# Patient Record
Sex: FEMALE | Race: WHITE | Employment: FULL TIME | URBAN - METROPOLITAN AREA
[De-identification: names, ages, dates, MRNs, and addresses within clinical notes are randomized per-mention and may not be internally consistent; named-entity substitution may affect disease eponyms.]

---

## 2017-05-22 DIAGNOSIS — E06.3 HASHIMOTO'S THYROIDITIS: Primary | ICD-10-CM

## 2017-05-22 DIAGNOSIS — E06.3 HASHIMOTO'S THYROIDITIS: ICD-10-CM

## 2017-05-22 LAB
T3 TOTAL: 1.08 NG/ML (ref 0.8–2)
T4 TOTAL: 6.1 UG/DL (ref 4.5–11.7)
TSH SERPL DL<=0.05 MIU/L-ACNC: 3.03 UIU/ML (ref 0.27–4.2)

## 2017-05-26 ENCOUNTER — OFFICE VISIT (OUTPATIENT)
Dept: SURGERY | Age: 52
End: 2017-05-26

## 2017-05-26 VITALS
HEIGHT: 66 IN | HEART RATE: 80 BPM | DIASTOLIC BLOOD PRESSURE: 76 MMHG | SYSTOLIC BLOOD PRESSURE: 124 MMHG | WEIGHT: 177 LBS | BODY MASS INDEX: 28.45 KG/M2

## 2017-05-26 DIAGNOSIS — E06.3 HASHIMOTO'S THYROIDITIS: Primary | ICD-10-CM

## 2017-05-26 DIAGNOSIS — E89.0 S/P PARTIAL THYROIDECTOMY: ICD-10-CM

## 2017-05-26 PROCEDURE — 99213 OFFICE O/P EST LOW 20 MIN: CPT | Performed by: INTERNAL MEDICINE

## 2017-06-16 ENCOUNTER — HOSPITAL ENCOUNTER (OUTPATIENT)
Dept: WOMENS IMAGING | Age: 52
Discharge: HOME | End: 2017-06-16
Payer: COMMERCIAL

## 2017-06-16 DIAGNOSIS — Z13.9 SCREENING: ICD-10-CM

## 2017-06-16 PROCEDURE — G0202 SCR MAMMO BI INCL CAD: HCPCS

## 2018-05-19 DIAGNOSIS — E06.3 HASHIMOTO'S THYROIDITIS: ICD-10-CM

## 2018-05-19 LAB
T4 FREE: 1.11 NG/DL (ref 0.93–1.7)
TSH SERPL DL<=0.05 MIU/L-ACNC: 2.45 UIU/ML (ref 0.27–4.2)

## 2018-05-25 ENCOUNTER — OFFICE VISIT (OUTPATIENT)
Dept: ENDOCRINOLOGY | Age: 53
End: 2018-05-25
Payer: COMMERCIAL

## 2018-05-25 VITALS
DIASTOLIC BLOOD PRESSURE: 72 MMHG | HEART RATE: 91 BPM | BODY MASS INDEX: 28.93 KG/M2 | HEIGHT: 66 IN | WEIGHT: 180 LBS | SYSTOLIC BLOOD PRESSURE: 107 MMHG

## 2018-05-25 DIAGNOSIS — E89.0 S/P PARTIAL THYROIDECTOMY: ICD-10-CM

## 2018-05-25 DIAGNOSIS — E06.3 HASHIMOTO'S THYROIDITIS: Primary | ICD-10-CM

## 2018-05-25 PROCEDURE — 99213 OFFICE O/P EST LOW 20 MIN: CPT | Performed by: INTERNAL MEDICINE

## 2018-07-05 ENCOUNTER — HOSPITAL ENCOUNTER (OUTPATIENT)
Dept: WOMENS IMAGING | Age: 53
Discharge: HOME | End: 2018-07-07
Payer: COMMERCIAL

## 2018-07-05 DIAGNOSIS — Z12.31 ENCOUNTER FOR SCREENING MAMMOGRAM FOR BREAST CANCER: ICD-10-CM

## 2018-07-05 PROCEDURE — 77063 BREAST TOMOSYNTHESIS BI: CPT

## 2019-05-11 DIAGNOSIS — E89.0 S/P PARTIAL THYROIDECTOMY: ICD-10-CM

## 2019-05-11 LAB
T4 FREE: 0.92 NG/DL (ref 0.84–1.68)
TSH SERPL DL<=0.05 MIU/L-ACNC: 2.43 UIU/ML (ref 0.44–3.86)

## 2019-05-17 ENCOUNTER — OFFICE VISIT (OUTPATIENT)
Dept: ENDOCRINOLOGY | Age: 54
End: 2019-05-17
Payer: COMMERCIAL

## 2019-05-17 VITALS
HEIGHT: 66 IN | DIASTOLIC BLOOD PRESSURE: 69 MMHG | HEART RATE: 92 BPM | SYSTOLIC BLOOD PRESSURE: 104 MMHG | WEIGHT: 182 LBS | BODY MASS INDEX: 29.25 KG/M2

## 2019-05-17 DIAGNOSIS — E06.3 HASHIMOTO'S THYROIDITIS: Primary | ICD-10-CM

## 2019-05-17 PROCEDURE — 99213 OFFICE O/P EST LOW 20 MIN: CPT | Performed by: INTERNAL MEDICINE

## 2019-05-17 NOTE — PROGRESS NOTES
Subjective:      Patient ID: Juanita Lyles is a 48 y.o. female. 12 month f/u on hashimoto's   Other   This is a chronic (hashimoto's thyroiditis) problem. The current episode started more than 1 year ago. The problem has been unchanged. reviewed labs 11/2018 chem cbc and thyroid panel normal     S/p partial thyroidectomy in 2008 not on replacement     Results for David Painter (MRN [id number]) as of 5/17/2019 09:24   Ref. Range 5/11/2019 08:02   TSH Latest Ref Range: 0.440 - 3.860 uIU/mL 2.430   T4 Free Latest Ref Range: 0.84 - 1.68 ng/dL 0.92           Patient Active Problem List   Diagnosis    Hashimoto's thyroiditis     No Known Allergies      Review of Systems   All other systems reviewed and are negative. Vitals:    05/17/19 0904   BP: 104/69   Site: Left Upper Arm   Position: Sitting   Cuff Size: Large Adult   Pulse: 92   Weight: 182 lb (82.6 kg)   Height: 5' 6\" (1.676 m)       Objective:   Physical Exam   Constitutional: She appears well-developed and well-nourished. HENT:   Head: Normocephalic and atraumatic. Neck: Neck supple. Cardiovascular: Normal rate. Musculoskeletal: Normal range of motion. Skin: Skin is warm. Psychiatric: She has a normal mood and affect. Assessment:       Diagnosis Orders   1.  Hashimoto's thyroiditis  TSH without Reflex    T4, Free           Plan:      Orders Placed This Encounter   Procedures    TSH without Reflex     Standing Status:   Future     Standing Expiration Date:   5/16/2020    T4, Free     Standing Status:   Future     Standing Expiration Date:   5/17/2020     F/u in 12 months

## 2019-07-12 ENCOUNTER — HOSPITAL ENCOUNTER (OUTPATIENT)
Dept: WOMENS IMAGING | Age: 54
Discharge: HOME | End: 2019-07-14
Payer: COMMERCIAL

## 2019-07-12 DIAGNOSIS — Z12.31 ENCOUNTER FOR SCREENING MAMMOGRAM FOR BREAST CANCER: ICD-10-CM

## 2019-07-12 PROCEDURE — 77063 BREAST TOMOSYNTHESIS BI: CPT

## 2020-05-04 DIAGNOSIS — E06.3 HASHIMOTO'S THYROIDITIS: ICD-10-CM

## 2020-05-04 LAB
T4 FREE: 0.96 NG/DL (ref 0.84–1.68)
TSH SERPL DL<=0.05 MIU/L-ACNC: 3.2 UIU/ML (ref 0.44–3.86)

## 2020-05-15 ENCOUNTER — VIRTUAL VISIT (OUTPATIENT)
Dept: ENDOCRINOLOGY | Age: 55
End: 2020-05-15
Payer: COMMERCIAL

## 2020-05-15 PROCEDURE — 99442 PR PHYS/QHP TELEPHONE EVALUATION 11-20 MIN: CPT | Performed by: INTERNAL MEDICINE

## 2020-05-15 NOTE — PROGRESS NOTES
5/15/2020    TELEHEALTH EVALUATION -- Audio/Visual (During [facility] public health emergency)    Due to Jignesh 19 outbreak, patient's office visit was converted to a virtual visit. Patient was contacted and agreed to proceed with a virtual visit via Telephone Visit  The risks and benefits of converting to a virtual visit were discussed in light of the current infectious disease epidemic. Patient also understood that insurance coverage and co-pays are up to their individual insurance plans. HPI: Patient made aware this is a telephone visit billable visit agreed to proceed    Aayush Cobb (:  1965) has requested an audio/video evaluation for the following concern(s):    Hashimoto thyroiditis patient not on any replacement thyroid function test have been stable no active or new symptoms otherwise this is 12-month follow-up    Results for Mary Oswald (MRN [id number]) as of 5/15/2020 09:30   Ref.  Range 7/10/2018 01:28 2019 08:02 2019 07:52 2019 05:09 2020 08:01   TSH Latest Ref Range: 0.440 - 3.860 uIU/mL  2.430   3.200   T4 Free Latest Ref Range: 0.84 - 1.68 ng/dL  0.92   0.96     Patient Active Problem List   Diagnosis    Hashimoto's thyroiditis         Review of Systems    Prior to Visit Medications    Not on File       Social History     Tobacco Use    Smoking status: Not on file   Substance Use Topics    Alcohol use: Not on file    Drug use: Not on file            PHYSICAL EXAMINATION:  [ INSTRUCTIONS:  \"[x]\" Indicates a positive item  \"[]\" Indicates a negative item  -- DELETE ALL ITEMS NOT EXAMINED]  [] Alert  [] Oriented to person/place/time    [] No apparent distress  [] Toxic appearing    [] Face flushed appearing [] Sclera clear  [] Lips are cyanotic      [] Breathing appears normal  [] Appears tachypneic      [] Rash on visible skin    [] Cranial Nerves II-XII grossly intact    [] Motor grossly intact in visible upper extremities    [] Motor grossly intact in

## 2021-05-04 ENCOUNTER — HOSPITAL ENCOUNTER (OUTPATIENT)
Dept: WOMENS IMAGING | Age: 56
Discharge: HOME | End: 2021-05-06
Payer: COMMERCIAL

## 2021-05-04 DIAGNOSIS — E89.0 S/P PARTIAL THYROIDECTOMY: ICD-10-CM

## 2021-05-04 DIAGNOSIS — Z12.31 ENCOUNTER FOR SCREENING MAMMOGRAM FOR MALIGNANT NEOPLASM OF BREAST: ICD-10-CM

## 2021-05-04 LAB
T4 FREE: 0.93 NG/DL (ref 0.84–1.68)
TSH SERPL DL<=0.05 MIU/L-ACNC: 1.69 UIU/ML (ref 0.44–3.86)

## 2021-05-04 PROCEDURE — 77063 BREAST TOMOSYNTHESIS BI: CPT

## 2021-05-14 ENCOUNTER — OFFICE VISIT (OUTPATIENT)
Dept: ENDOCRINOLOGY | Age: 56
End: 2021-05-14
Payer: COMMERCIAL

## 2021-05-14 VITALS
HEART RATE: 76 BPM | SYSTOLIC BLOOD PRESSURE: 107 MMHG | OXYGEN SATURATION: 98 % | DIASTOLIC BLOOD PRESSURE: 64 MMHG | HEIGHT: 66 IN | WEIGHT: 177 LBS | BODY MASS INDEX: 28.45 KG/M2

## 2021-05-14 DIAGNOSIS — E06.3 HASHIMOTO'S THYROIDITIS: Primary | ICD-10-CM

## 2021-05-14 DIAGNOSIS — E89.0 S/P PARTIAL THYROIDECTOMY: ICD-10-CM

## 2021-05-14 PROCEDURE — 99213 OFFICE O/P EST LOW 20 MIN: CPT | Performed by: INTERNAL MEDICINE

## 2021-05-14 ASSESSMENT — ENCOUNTER SYMPTOMS: SWOLLEN GLANDS: 0

## 2021-05-14 NOTE — PROGRESS NOTES
Neurological:      General: No focal deficit present. Mental Status: She is alert and oriented to person, place, and time.    Psychiatric:         Mood and Affect: Mood normal.         Behavior: Behavior normal.

## 2022-05-03 DIAGNOSIS — E06.3 HASHIMOTO'S THYROIDITIS: ICD-10-CM

## 2022-05-03 LAB
T4 FREE: 0.99 NG/DL (ref 0.84–1.68)
TSH REFLEX: 1.99 UIU/ML (ref 0.44–3.86)

## 2022-05-13 ENCOUNTER — OFFICE VISIT (OUTPATIENT)
Dept: ENDOCRINOLOGY | Age: 57
End: 2022-05-13
Payer: COMMERCIAL

## 2022-05-13 VITALS
BODY MASS INDEX: 28.51 KG/M2 | OXYGEN SATURATION: 98 % | HEART RATE: 71 BPM | SYSTOLIC BLOOD PRESSURE: 114 MMHG | DIASTOLIC BLOOD PRESSURE: 70 MMHG | HEIGHT: 66 IN | WEIGHT: 177.4 LBS

## 2022-05-13 DIAGNOSIS — E06.3 HASHIMOTO'S THYROIDITIS: Primary | ICD-10-CM

## 2022-05-13 PROCEDURE — 99213 OFFICE O/P EST LOW 20 MIN: CPT | Performed by: INTERNAL MEDICINE

## 2022-05-13 NOTE — PROGRESS NOTES
5/13/2022    Assessment:       Diagnosis Orders   1. Hashimoto's thyroiditis           PLAN:     RPT LAB IN 12 MONTHS     Orders Placed This Encounter   Procedures    TSH with Reflex     Standing Status:   Future     Standing Expiration Date:   5/13/2023    T4, Free     Standing Status:   Future     Standing Expiration Date:   5/13/2023       Subjective:     Chief Complaint   Patient presents with    Hashimoto's Thyroiditis     Vitals:    05/13/22 0849   BP: 114/70   Site: Left Upper Arm   Position: Sitting   Cuff Size: Medium Adult   Pulse: 71   SpO2: 98%   Weight: 177 lb 6.4 oz (80.5 kg)   Height: 5' 6\" (1.676 m)     Wt Readings from Last 3 Encounters:   05/13/22 177 lb 6.4 oz (80.5 kg)   05/14/21 177 lb (80.3 kg)   05/17/19 182 lb (82.6 kg)     BP Readings from Last 3 Encounters:   05/13/22 114/70   05/14/21 107/64   05/17/19 104/69     Follow-up on Hashimoto thyroiditis patient not on replacement labs were done recently stable recently had headaches was evaluated probably related to stress patient denies any heat or cold intolerance    Other  This is a chronic problem. The current episode started more than 1 year ago. The problem occurs rarely. The problem has been waxing and waning. Pertinent negatives include no neck pain. She has tried nothing for the symptoms. The treatment provided moderate relief. Results for Marie Gold (MRN [id number]) as of 5/13/2022 09:21   Ref. Range 5/4/2020 08:01 5/4/2021 08:16 5/4/2021 08:42 5/3/2022 08:20   TSH Latest Ref Range: 0.440 - 3.860 uIU/mL 3.200  1.690 1.990   T4 Free Latest Ref Range: 0.84 - 1.68 ng/dL 0.96  0.93 0.99       No past medical history on file. No past surgical history on file.   Social History     Socioeconomic History    Marital status: Single     Spouse name: Not on file    Number of children: Not on file    Years of education: Not on file    Highest education level: Not on file   Occupational History    Not on file   Tobacco Use    Smoking status: Never Smoker    Smokeless tobacco: Never Used   Substance and Sexual Activity    Alcohol use: Not on file    Drug use: Not on file    Sexual activity: Not on file   Other Topics Concern    Not on file   Social History Narrative    Not on file     Social Determinants of Health     Financial Resource Strain:     Difficulty of Paying Living Expenses: Not on file   Food Insecurity:     Worried About Running Out of Food in the Last Year: Not on file    Josiah of Food in the Last Year: Not on file   Transportation Needs:     Lack of Transportation (Medical): Not on file    Lack of Transportation (Non-Medical): Not on file   Physical Activity:     Days of Exercise per Week: Not on file    Minutes of Exercise per Session: Not on file   Stress:     Feeling of Stress : Not on file   Social Connections:     Frequency of Communication with Friends and Family: Not on file    Frequency of Social Gatherings with Friends and Family: Not on file    Attends Muslim Services: Not on file    Active Member of [address] [place] [address] [employer] or Organizations: Not on file    Attends Club or Organization Meetings: Not on file    Marital Status: Not on file   Intimate Partner Violence:     Fear of Current or Ex-Partner: Not on file    Emotionally Abused: Not on file    Physically Abused: Not on file    Sexually Abused: Not on file   Housing Stability:     Unable to Pay for Housing in the Last Year: Not on file    Number of [place] in the Last Year: Not on file    Unstable Housing in the Last Year: Not on file     Family History   Problem Relation Age of Onset    Breast Cancer Paternal Grandmother      Allergies   Allergen Reactions    Thiopental Other (See Comments)     Becomes sick and blood pressure drops    [place] Venom Swelling    Diphenhydramine Rash     No current outpatient medications on file.     Lab Results   Component Value Date    TSH 1.690 05/04/2021    TSH 3.200 05/04/2020    TSH 2.430 05/11/2019    TSHREFLEX 1.990 05/03/2022    T4FREE 0.99 05/03/2022    T4FREE 0.93 05/04/2021    T4FREE 0.96 05/04/2020     No results found for: TPOABS    Review of Systems   Cardiovascular: Negative. Endocrine: Negative for cold intolerance and heat intolerance. Musculoskeletal: Negative for neck pain. Neurological: Negative. All other systems reviewed and are negative. Objective:   Physical Exam  Vitals reviewed. Constitutional:       Appearance: Normal appearance. HENT:      Head: Normocephalic and atraumatic. Right Ear: External ear normal.      Left Ear: External ear normal.      Nose: Nose normal.   Eyes:      General: No scleral icterus. Right eye: No discharge. Left eye: No discharge. Extraocular Movements: Extraocular movements intact. Conjunctiva/sclera: Conjunctivae normal.   Cardiovascular:      Rate and Rhythm: Normal rate. Pulmonary:      Effort: Pulmonary effort is normal.   Musculoskeletal:         General: Normal range of motion. Cervical back: Normal range of motion and neck supple. Neurological:      General: No focal deficit present. Mental Status: She is alert and oriented to person, place, and time.    Psychiatric:         Mood and Affect: Mood normal.         Behavior: Behavior normal. Patient

## 2022-12-12 ENCOUNTER — HOSPITAL ENCOUNTER (OUTPATIENT)
Dept: WOMENS IMAGING | Age: 57
Discharge: HOME | End: 2022-12-14
Payer: COMMERCIAL

## 2022-12-12 DIAGNOSIS — Z12.31 ENCOUNTER FOR SCREENING MAMMOGRAM FOR MALIGNANT NEOPLASM OF BREAST: ICD-10-CM

## 2022-12-12 PROCEDURE — 77063 BREAST TOMOSYNTHESIS BI: CPT

## 2023-05-05 DIAGNOSIS — E06.3 HASHIMOTO'S THYROIDITIS: ICD-10-CM

## 2023-05-05 LAB
T4 FREE SERPL-MCNC: 1.13 NG/DL (ref 0.84–1.68)
TSH REFLEX: 2.29 UIU/ML (ref 0.44–3.86)

## 2023-05-12 ENCOUNTER — OFFICE VISIT (OUTPATIENT)
Dept: ENDOCRINOLOGY | Age: 58
End: 2023-05-12
Payer: COMMERCIAL

## 2023-05-12 VITALS
DIASTOLIC BLOOD PRESSURE: 66 MMHG | WEIGHT: 179 LBS | BODY MASS INDEX: 28.77 KG/M2 | SYSTOLIC BLOOD PRESSURE: 102 MMHG | OXYGEN SATURATION: 97 % | HEIGHT: 66 IN | HEART RATE: 77 BPM

## 2023-05-12 DIAGNOSIS — E89.0 S/P PARTIAL THYROIDECTOMY: ICD-10-CM

## 2023-05-12 DIAGNOSIS — E06.3 HASHIMOTO'S THYROIDITIS: Primary | ICD-10-CM

## 2023-05-12 PROCEDURE — 99213 OFFICE O/P EST LOW 20 MIN: CPT | Performed by: INTERNAL MEDICINE

## 2023-05-12 NOTE — PROGRESS NOTES
5/12/2023    Assessment:       Diagnosis Orders   1. Hashimoto's thyroiditis  TSH with Reflex    T4, Free      2. S/P partial thyroidectomy              PLAN:     Continue monitoring thyroid function test patient to follow-up in 12 months  Orders Placed This Encounter   Procedures    TSH with Reflex     Standing Status:   Future     Standing Expiration Date:   5/12/2024    T4, Free     Standing Status:   Future     Standing Expiration Date:   5/12/2024       Subjective:     Chief Complaint   Patient presents with    Hashimoto's Thyroiditis     Vitals:    05/12/23 0855   BP: 102/66   Site: Left Upper Arm   Position: Sitting   Cuff Size: Medium Adult   Pulse: 77   SpO2: 97%   Weight: 179 lb (81.2 kg)   Height: 5' 6\" (1.676 m)     Wt Readings from Last 3 Encounters:   05/12/23 179 lb (81.2 kg)   05/13/22 177 lb 6.4 oz (80.5 kg)   05/14/21 177 lb (80.3 kg)     BP Readings from Last 3 Encounters:   05/12/23 102/66   05/13/22 114/70   05/14/21 107/64     Follow-up on Hashimoto thyroiditis history of partial thyroidectomy in 2008 thyroid function test have been stable over the years    Thyroid Problem  Presents for follow-up visit. Patient reports no cold intolerance or heat intolerance. The symptoms have been stable. Latest Reference Range & Units 05/03/22 08:20 05/05/23 09:38   TSH 0.440 - 3.860 uIU/mL 1.990 2.290   T4 Free 0.84 - 1.68 ng/dL 0.99 1.13       No past medical history on file. No past surgical history on file.   Social History     Socioeconomic History    Marital status: Single     Spouse name: Not on file    Number of children: Not on file    Years of education: Not on file    Highest education level: Not on file   Occupational History    Not on file   Tobacco Use    Smoking status: Never    Smokeless tobacco: Never   Substance and Sexual Activity    Alcohol use: Not on file    Drug use: Not on file    Sexual activity: Not on file   Other Topics Concern    Not on file   Social History Narrative

## 2023-12-14 ENCOUNTER — HOSPITAL ENCOUNTER (OUTPATIENT)
Dept: WOMENS IMAGING | Age: 58
Discharge: HOME | End: 2023-12-16
Attending: INTERNAL MEDICINE
Payer: COMMERCIAL

## 2023-12-14 VITALS — BODY MASS INDEX: 28.25 KG/M2 | WEIGHT: 175 LBS

## 2023-12-14 DIAGNOSIS — Z12.31 SCREENING MAMMOGRAM FOR BREAST CANCER: ICD-10-CM

## 2023-12-14 DIAGNOSIS — Z12.31 ENCOUNTER FOR MAMMOGRAM TO ESTABLISH BASELINE MAMMOGRAM: ICD-10-CM

## 2023-12-14 PROCEDURE — 77063 BREAST TOMOSYNTHESIS BI: CPT

## 2024-05-03 DIAGNOSIS — E06.3 HASHIMOTO'S THYROIDITIS: ICD-10-CM

## 2024-05-03 LAB
T4 FREE SERPL-MCNC: 1.08 NG/DL (ref 0.84–1.68)
TSH REFLEX: 1.48 UIU/ML (ref 0.44–3.86)

## 2024-05-16 ENCOUNTER — OFFICE VISIT (OUTPATIENT)
Dept: ENDOCRINOLOGY | Age: 59
End: 2024-05-16
Payer: COMMERCIAL

## 2024-05-16 VITALS
BODY MASS INDEX: 28.57 KG/M2 | HEART RATE: 75 BPM | OXYGEN SATURATION: 96 % | DIASTOLIC BLOOD PRESSURE: 68 MMHG | WEIGHT: 177 LBS | SYSTOLIC BLOOD PRESSURE: 109 MMHG

## 2024-05-16 DIAGNOSIS — E06.3 HASHIMOTO'S THYROIDITIS: Primary | ICD-10-CM

## 2024-05-16 PROCEDURE — 99213 OFFICE O/P EST LOW 20 MIN: CPT | Performed by: INTERNAL MEDICINE

## 2024-05-16 ASSESSMENT — ENCOUNTER SYMPTOMS: TROUBLE SWALLOWING: 0

## 2024-05-16 NOTE — PROGRESS NOTES
5/16/2024    Assessment:       Diagnosis Orders   1. Hashimoto's thyroiditis              PLAN:     Orders Placed This Encounter   Procedures    T4, Free     Standing Status:   Future     Standing Expiration Date:   5/16/2025    TSH     Standing Status:   Future     Standing Expiration Date:   5/16/2025     Continue monitoring thyroid function test patient to follow-up in 12 months  Subjective:     Chief Complaint   Patient presents with    Hashimoto's Thyroiditis     Pt states things are good nothing change.     Vitals:    05/16/24 0854   BP: 109/68   Site: Left Upper Arm   Position: Sitting   Cuff Size: Medium Adult   Pulse: 75   SpO2: 96%   Weight: 80.3 kg (177 lb)     Wt Readings from Last 3 Encounters:   05/16/24 80.3 kg (177 lb)   12/14/23 79.4 kg (175 lb)   05/12/23 81.2 kg (179 lb)     BP Readings from Last 3 Encounters:   05/16/24 109/68   05/12/23 102/66   05/13/22 114/70     Follow-up on Hashimoto thyroiditis patient not on any replacement denies any neck pain recent stressors due to family loss    Thyroid Problem  Presents for follow-up visit. Patient reports no cold intolerance or heat intolerance. The symptoms have been stable.        Latest Reference Range & Units 05/03/22 08:20 05/05/23 09:38 05/03/24 09:44   TSH 0.440 - 3.860 uIU/mL 1.990 2.290 1.480   T4 Free 0.84 - 1.68 ng/dL 0.99 1.13 1.08         No past medical history on file.  Past Surgical History:   Procedure Laterality Date    HYSTERECTOMY (CERVIX STATUS UNKNOWN)       Social History     Socioeconomic History    Marital status: Single     Spouse name: Not on file    Number of children: Not on file    Years of education: Not on file    Highest education level: Not on file   Occupational History    Not on file   Tobacco Use    Smoking status: Never    Smokeless tobacco: Never   Vaping Use    Vaping Use: Never used   Substance and Sexual Activity    Alcohol use: Not Currently    Drug use: Not Currently    Sexual activity: Not on file   Other

## 2024-12-16 ENCOUNTER — HOSPITAL ENCOUNTER (OUTPATIENT)
Dept: WOMENS IMAGING | Age: 59
Discharge: HOME | End: 2024-12-18
Attending: INTERNAL MEDICINE
Payer: COMMERCIAL

## 2024-12-16 DIAGNOSIS — Z12.31 VISIT FOR SCREENING MAMMOGRAM: ICD-10-CM

## 2024-12-16 PROCEDURE — 77063 BREAST TOMOSYNTHESIS BI: CPT

## 2025-05-13 DIAGNOSIS — E06.3 HASHIMOTO'S THYROIDITIS: ICD-10-CM

## 2025-05-13 LAB
T4 FREE SERPL-MCNC: 1.02 NG/DL (ref 0.84–1.68)
TSH SERPL-MCNC: 1.47 UIU/ML (ref 0.44–3.86)

## 2025-05-19 ENCOUNTER — OFFICE VISIT (OUTPATIENT)
Age: 60
End: 2025-05-19

## 2025-05-19 VITALS
OXYGEN SATURATION: 98 % | WEIGHT: 171.96 LBS | HEART RATE: 75 BPM | DIASTOLIC BLOOD PRESSURE: 61 MMHG | BODY MASS INDEX: 27.75 KG/M2 | SYSTOLIC BLOOD PRESSURE: 97 MMHG

## 2025-05-19 DIAGNOSIS — E06.3 HASHIMOTO'S THYROIDITIS: Primary | ICD-10-CM

## 2025-05-19 PROCEDURE — 99213 OFFICE O/P EST LOW 20 MIN: CPT | Performed by: INTERNAL MEDICINE

## 2025-05-19 PROCEDURE — 99212 OFFICE O/P EST SF 10 MIN: CPT | Performed by: INTERNAL MEDICINE

## 2025-05-19 NOTE — PROGRESS NOTES
5/19/2025    Assessment:       Diagnosis Orders   1. Hashimoto's thyroiditis              PLAN:     Orders Placed This Encounter   Procedures    T4, Free     Standing Status:   Future     Expected Date:   5/19/2025     Expiration Date:   5/19/2026    TSH reflex to FT4     Standing Status:   Future     Expected Date:   5/19/2025     Expiration Date:   5/19/2026     Patient reported thyroid function test patient to follow-up in 12 months time  Subjective:     Chief Complaint   Patient presents with    Hashimoto's Thyroiditis    Other     Lost 6lbs since 5-16-24     Vitals:    05/19/25 0919   BP: 97/61   Pulse: 75   SpO2: 98%   Weight: 78 kg (171 lb 15.3 oz)     Wt Readings from Last 3 Encounters:   05/19/25 78 kg (171 lb 15.3 oz)   05/16/24 80.3 kg (177 lb)   12/14/23 79.4 kg (175 lb)     BP Readings from Last 3 Encounters:   05/19/25 97/61   05/16/24 109/68   05/12/23 102/66     Follow-up on Hashimoto thyroiditis patient currently not on thyroid replacement thyroid function test have been stable has lost weight because of being physically active has been seen every 12 months    Thyroid Problem  Presents for follow-up visit. Symptoms include weight loss. Patient reports no cold intolerance or heat intolerance. The symptoms have been stable.     No past medical history on file.  Past Surgical History:   Procedure Laterality Date    HYSTERECTOMY (CERVIX STATUS UNKNOWN)       Social History     Socioeconomic History    Marital status: Single     Spouse name: Not on file    Number of children: Not on file    Years of education: Not on file    Highest education level: Not on file   Occupational History    Not on file   Tobacco Use    Smoking status: Never     Passive exposure: Never    Smokeless tobacco: Never   Vaping Use    Vaping status: Never Used   Substance and Sexual Activity    Alcohol use: Not Currently    Drug use: Not Currently    Sexual activity: Not on file   Other Topics Concern    Not on file   Social